# Patient Record
Sex: MALE | Race: OTHER
[De-identification: names, ages, dates, MRNs, and addresses within clinical notes are randomized per-mention and may not be internally consistent; named-entity substitution may affect disease eponyms.]

---

## 2019-11-08 ENCOUNTER — HOSPITAL ENCOUNTER (EMERGENCY)
Dept: HOSPITAL 54 - ER | Age: 34
Discharge: HOME | End: 2019-11-08
Payer: COMMERCIAL

## 2019-11-08 VITALS — BODY MASS INDEX: 21 KG/M2 | HEIGHT: 71 IN | WEIGHT: 150 LBS

## 2019-11-08 VITALS — SYSTOLIC BLOOD PRESSURE: 124 MMHG | DIASTOLIC BLOOD PRESSURE: 82 MMHG

## 2019-11-08 DIAGNOSIS — S09.8XXA: Primary | ICD-10-CM
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
## 2019-11-08 DIAGNOSIS — R51: ICD-10-CM

## 2019-11-08 DIAGNOSIS — R07.89: ICD-10-CM
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
## 2019-11-08 DIAGNOSIS — R40.4: ICD-10-CM

## 2019-11-08 NOTE — NUR
PT BIB FRIEND C/O DIZZINESS AND HEADACHE S/P "PLATFORM FROM SCAFFOLD FELL ON MY 
HEAD" PT IS AAOX4, NOT IN RESPIRATORY DISTRESS, HOOKED TO MONITOR, KEPT RESTED 
AND COMFORTABLE, WILL CONTINUE TO MONITOR.
